# Patient Record
Sex: MALE | Race: BLACK OR AFRICAN AMERICAN | URBAN - METROPOLITAN AREA
[De-identification: names, ages, dates, MRNs, and addresses within clinical notes are randomized per-mention and may not be internally consistent; named-entity substitution may affect disease eponyms.]

---

## 2018-05-03 ENCOUNTER — NEW PATIENT (OUTPATIENT)
Dept: URBAN - METROPOLITAN AREA CLINIC 24 | Facility: CLINIC | Age: 48
End: 2018-05-03
Payer: COMMERCIAL

## 2018-05-03 DIAGNOSIS — R73.03 OTHER ABNORMAL GLUCOSE: ICD-10-CM

## 2018-05-03 DIAGNOSIS — H40.053 OCULAR HYPERTENSION, BILATERAL: Primary | ICD-10-CM

## 2018-05-03 PROCEDURE — 92004 COMPRE OPH EXAM NEW PT 1/>: CPT | Performed by: OPTOMETRIST

## 2018-05-03 PROCEDURE — 76514 ECHO EXAM OF EYE THICKNESS: CPT | Performed by: OPTOMETRIST

## 2018-05-03 PROCEDURE — 92015 DETERMINE REFRACTIVE STATE: CPT | Performed by: OPTOMETRIST

## 2018-05-03 PROCEDURE — 92133 CPTRZD OPH DX IMG PST SGM ON: CPT | Performed by: OPTOMETRIST

## 2018-05-03 ASSESSMENT — KERATOMETRY
OD: 42.51
OS: 42.79

## 2018-05-03 ASSESSMENT — INTRAOCULAR PRESSURE
OD: 27
OS: 29

## 2018-05-03 ASSESSMENT — VISUAL ACUITY
OS: 20/20
OD: 20/20

## 2022-05-11 NOTE — IMPRESSION/PLAN
Impression: Open angle with borderline findings, high risk, bilateral: H40.023. Bilateral. Condition: continue to monitor. Plan: Pt at risk for Glaucoma         Pachs:  579/582    Today's IOP :  21/21       Tmax  : 27/29 Target IOP low to mid teens Pt denies Fhx of Glaucoma Left eye is the better seeing eye VF: needed C/D: .6 round OU
OCT: 05/11/2022, 83 OD / 85 OS
DE: 05/11/2022 Pt denies Sulfa Allergy   // Pt denies Lung /Heart dx Plan :
1. No drops necessary at this time. 2. Discussed that IOP is elevated along with suspicious ONH appearance. Brief discussion on drops vs consult for laser treatment with glaucoma specialist, patient elects laser consult. Call with any further vision changes.

## 2022-09-01 NOTE — IMPRESSION/PLAN
Impression: Ocular hypertension, bilateral: H40.053. Plan: Pt at risk for Glaucoma    Gonio SS 3+    Pachs:  579/582 Today's IOP :  22/22       Tmax  : 27/29 Target IOP low to mid teens Pt denies Fhx of Glaucoma Left eye is the better seeing eye VF: OD scattered defects OS ring scotoma 9/1/22 C/D: .0.45x0.45/
OCT: 05/11/2022, 83 OD / 85 OS
DE: 05/11/2022 Pt denies Sulfa Allergy   // Pt denies Lung /Heart dx Plan :
1. No drops necessary at this time. 2. No signs of glaucoma at this time. 3. Patient to [facility] NA for MRX and 1 year with Dr. Hakeem Pope for annual check.

## 2022-11-30 ENCOUNTER — OFFICE VISIT (OUTPATIENT)
Dept: URBAN - METROPOLITAN AREA CLINIC 23 | Facility: CLINIC | Age: 52
End: 2022-11-30

## 2022-11-30 DIAGNOSIS — H52.03 HYPERMETROPIA, BILATERAL: Primary | ICD-10-CM

## 2022-11-30 ASSESSMENT — VISUAL ACUITY
OD: 20/20
OS: 20/20

## 2022-11-30 ASSESSMENT — KERATOMETRY
OS: 42.88
OD: 42.13

## 2024-02-05 ENCOUNTER — OFFICE VISIT (OUTPATIENT)
Dept: URBAN - METROPOLITAN AREA CLINIC 23 | Facility: CLINIC | Age: 54
End: 2024-02-05
Payer: COMMERCIAL

## 2024-02-05 DIAGNOSIS — H40.023 OPEN ANGLE WITH BORDERLINE FINDINGS, HIGH RISK, BILATERAL: Primary | ICD-10-CM

## 2024-02-05 DIAGNOSIS — H25.813 COMBINED FORMS OF AGE-RELATED CATARACT, BILATERAL: ICD-10-CM

## 2024-02-05 DIAGNOSIS — H52.03 HYPERMETROPIA, BILATERAL: ICD-10-CM

## 2024-02-05 PROCEDURE — 92015 DETERMINE REFRACTIVE STATE: CPT | Performed by: OPTOMETRIST

## 2024-02-05 PROCEDURE — 92014 COMPRE OPH EXAM EST PT 1/>: CPT | Performed by: OPTOMETRIST

## 2024-02-05 ASSESSMENT — VISUAL ACUITY
OD: 20/25
OS: 20/25

## 2024-02-05 ASSESSMENT — KERATOMETRY
OD: 42.38
OS: 42.63

## 2024-02-05 ASSESSMENT — INTRAOCULAR PRESSURE
OS: 24
OD: 22